# Patient Record
Sex: MALE | Race: WHITE | HISPANIC OR LATINO | Employment: FULL TIME | URBAN - METROPOLITAN AREA
[De-identification: names, ages, dates, MRNs, and addresses within clinical notes are randomized per-mention and may not be internally consistent; named-entity substitution may affect disease eponyms.]

---

## 2024-09-12 ENCOUNTER — OFFICE VISIT (OUTPATIENT)
Dept: URGENT CARE | Facility: PHYSICIAN GROUP | Age: 29
End: 2024-09-12
Payer: COMMERCIAL

## 2024-09-12 VITALS
HEART RATE: 84 BPM | WEIGHT: 130 LBS | OXYGEN SATURATION: 96 % | HEIGHT: 63 IN | RESPIRATION RATE: 16 BRPM | SYSTOLIC BLOOD PRESSURE: 112 MMHG | DIASTOLIC BLOOD PRESSURE: 60 MMHG | BODY MASS INDEX: 23.04 KG/M2 | TEMPERATURE: 97.7 F

## 2024-09-12 DIAGNOSIS — R31.9 HEMATURIA, UNSPECIFIED TYPE: ICD-10-CM

## 2024-09-12 LAB
APPEARANCE UR: CLEAR
BILIRUB UR STRIP-MCNC: NEGATIVE MG/DL
COLOR UR AUTO: YELLOW
GLUCOSE UR STRIP.AUTO-MCNC: NEGATIVE MG/DL
KETONES UR STRIP.AUTO-MCNC: NEGATIVE MG/DL
LEUKOCYTE ESTERASE UR QL STRIP.AUTO: NEGATIVE
NITRITE UR QL STRIP.AUTO: NEGATIVE
PH UR STRIP.AUTO: 7.5 [PH] (ref 5–8)
PROT UR QL STRIP: NEGATIVE MG/DL
RBC UR QL AUTO: NEGATIVE
SP GR UR STRIP.AUTO: 1.01
UROBILINOGEN UR STRIP-MCNC: 0.2 MG/DL

## 2024-09-12 PROCEDURE — 99203 OFFICE O/P NEW LOW 30 MIN: CPT | Performed by: NURSE PRACTITIONER

## 2024-09-12 PROCEDURE — 81002 URINALYSIS NONAUTO W/O SCOPE: CPT | Performed by: NURSE PRACTITIONER

## 2024-09-12 PROCEDURE — 3078F DIAST BP <80 MM HG: CPT | Performed by: NURSE PRACTITIONER

## 2024-09-12 PROCEDURE — 3074F SYST BP LT 130 MM HG: CPT | Performed by: NURSE PRACTITIONER

## 2024-09-12 ASSESSMENT — ENCOUNTER SYMPTOMS
CHILLS: 0
BACK PAIN: 0
FEVER: 0
VOMITING: 0
ABDOMINAL PAIN: 0
NAUSEA: 0
FLANK PAIN: 0
DIARRHEA: 0

## 2024-09-12 NOTE — PROGRESS NOTES
Subjective     Sam Bowman is a 28 y.o. male who presents with Blood in Urine (Started last night)            HPI  New problem.  Patient is a 28-year-old male who presents with blood in his urine that he noticed last night.  He reports that today he has not noticed any more of this.  He denies burning with urination, frequency, urgency, abdominal pain, or back pain.  He denies any penile discharge.  He reports that he has never noticed blood in his urine prior to last night.    Patient has no known allergies.  No current outpatient medications on file prior to visit.     No current facility-administered medications on file prior to visit.     Social History     Socioeconomic History    Marital status: Single     Spouse name: Not on file    Number of children: Not on file    Years of education: Not on file    Highest education level: Not on file   Occupational History    Not on file   Tobacco Use    Smoking status: Not on file    Smokeless tobacco: Not on file   Substance and Sexual Activity    Alcohol use: Not on file    Drug use: Not on file    Sexual activity: Not on file   Other Topics Concern    Not on file   Social History Narrative    Not on file     Social Determinants of Health     Financial Resource Strain: Not on file   Food Insecurity: Not on file   Transportation Needs: Not on file   Physical Activity: Not on file   Stress: Not on file   Social Connections: Not on file   Intimate Partner Violence: Not on file   Housing Stability: Not on file     Breast Cancer-related family history is not on file.      Review of Systems   Constitutional:  Negative for chills and fever.   Gastrointestinal:  Negative for abdominal pain, diarrhea, nausea and vomiting.   Genitourinary:  Positive for hematuria. Negative for dysuria, flank pain, frequency and urgency.   Musculoskeletal:  Negative for back pain.              Objective     /60 (BP Location: Left arm, Patient Position: Sitting, BP Cuff Size: Adult)    "Pulse 84   Temp 36.5 °C (97.7 °F) (Temporal)   Resp 16   Ht 1.6 m (5' 3\")   Wt 59 kg (130 lb)   SpO2 96%   BMI 23.03 kg/m²      Physical Exam  Vitals reviewed.   Constitutional:       General: He is not in acute distress.     Appearance: He is well-developed.   Cardiovascular:      Rate and Rhythm: Normal rate and regular rhythm.      Heart sounds: Normal heart sounds. No murmur heard.  Pulmonary:      Effort: Pulmonary effort is normal. No respiratory distress.      Breath sounds: Normal breath sounds.   Abdominal:      General: Bowel sounds are normal.      Palpations: Abdomen is soft.      Tenderness: There is no abdominal tenderness. There is no right CVA tenderness or left CVA tenderness.   Musculoskeletal:         General: Normal range of motion.      Comments: Moves all 4 extremities normally   Skin:     General: Skin is warm and dry.   Neurological:      Mental Status: He is alert and oriented to person, place, and time.   Psychiatric:         Behavior: Behavior normal.         Thought Content: Thought content normal.                             Assessment & Plan   1. Hematuria, unspecified type  POCT Urinalysis    Referral to Urology        Urinalysis negative for blood.  Referral to urology per patient request.  Differential diagnosis, natural history, supportive care, and indications for immediate follow-up were discussed.        Assessment & Plan  Gross hematuria                       "

## 2024-09-20 NOTE — PROGRESS NOTES
Subjective  Sam Bowman is a 28 y.o. male who presents today for hematuria.He had gross hematuria but at urgent care his UA was negative.  First episode 2 weeks ago. Has recurred twice since then. No CVAT or dysuria. Stream ok. Never smoker, occasional marijuana use.       No family history on file.    Social History     Socioeconomic History    Marital status: Single       No past surgical history on file.    No past medical history on file.    No current outpatient medications on file.       No Known Allergies    Objective  There were no vitals taken for this visit.  Physical Exam  Constitutional:       Appearance: Normal appearance. He is normal weight.   Genitourinary:     Penis: Normal.    Neurological:      Mental Status: He is alert.           Labs:   POCT UA   Lab Results   Component Value Date/Time    POCCOLOR Yellow 09/12/2024 10:08 AM    POCAPPEAR Clear 09/12/2024 10:08 AM    POCLEUKEST Negative 09/12/2024 10:08 AM    POCNITRITE Negative 09/12/2024 10:08 AM    POCUROBILIGE 0.2 09/12/2024 10:08 AM    POCPROTEIN Negative 09/12/2024 10:08 AM    POCURPH 7.5 09/12/2024 10:08 AM    POCBLOOD Negative 09/12/2024 10:08 AM    POCSPGRV 1.015 09/12/2024 10:08 AM    POCKETONES Negative 09/12/2024 10:08 AM    POCBILIRUBIN Negative 09/12/2024 10:08 AM    POCGLUCUA Negative 09/12/2024 10:08 AM        Imaging:   None        Assessment & Plan  Gross hematuria. Normal cysto. Will get BMP and CT urogram.  I will call him with the results of the CT scan.    Jeancarlos Schmidt M.D., F.A.C.S.  Urologic Oncology  Vice-Chair, Department of Surgery  [facility]

## 2024-09-24 ENCOUNTER — OFFICE VISIT (OUTPATIENT)
Dept: UROLOGY | Facility: MEDICAL CENTER | Age: 29
End: 2024-09-24
Payer: COMMERCIAL

## 2024-09-24 ENCOUNTER — HOSPITAL ENCOUNTER (OUTPATIENT)
Dept: LAB | Facility: MEDICAL CENTER | Age: 29
End: 2024-09-24
Attending: UROLOGY
Payer: COMMERCIAL

## 2024-09-24 VITALS
HEART RATE: 73 BPM | BODY MASS INDEX: 23.04 KG/M2 | SYSTOLIC BLOOD PRESSURE: 140 MMHG | WEIGHT: 130 LBS | TEMPERATURE: 97.8 F | OXYGEN SATURATION: 98 % | HEIGHT: 63 IN | DIASTOLIC BLOOD PRESSURE: 72 MMHG

## 2024-09-24 DIAGNOSIS — R31.0 HEMATURIA, GROSS: ICD-10-CM

## 2024-09-24 LAB
APPEARANCE UR: CLEAR
BILIRUB UR STRIP-MCNC: NORMAL MG/DL
COLOR UR AUTO: YELLOW
GLUCOSE UR STRIP.AUTO-MCNC: NORMAL MG/DL
KETONES UR STRIP.AUTO-MCNC: NORMAL MG/DL
LEUKOCYTE ESTERASE UR QL STRIP.AUTO: NORMAL
NITRITE UR QL STRIP.AUTO: NORMAL
PH UR STRIP.AUTO: 7 [PH] (ref 5–8)
PROT UR QL STRIP: NORMAL MG/DL
RBC UR QL AUTO: NORMAL
SP GR UR STRIP.AUTO: 1.02
UROBILINOGEN UR STRIP-MCNC: NORMAL MG/DL

## 2024-09-24 PROCEDURE — 52000 CYSTOURETHROSCOPY: CPT | Performed by: UROLOGY

## 2024-09-24 PROCEDURE — 81002 URINALYSIS NONAUTO W/O SCOPE: CPT | Performed by: UROLOGY

## 2024-09-24 PROCEDURE — 80048 BASIC METABOLIC PNL TOTAL CA: CPT

## 2024-09-24 PROCEDURE — 3078F DIAST BP <80 MM HG: CPT | Performed by: UROLOGY

## 2024-09-24 PROCEDURE — 36415 COLL VENOUS BLD VENIPUNCTURE: CPT

## 2024-09-24 PROCEDURE — 3077F SYST BP >= 140 MM HG: CPT | Performed by: UROLOGY

## 2024-09-24 PROCEDURE — 99203 OFFICE O/P NEW LOW 30 MIN: CPT | Mod: 25 | Performed by: UROLOGY

## 2024-09-24 NOTE — PROGRESS NOTES
"Subjective  Sam Bowman is a 28 y.o. male who presents today for cystoscopy to evaluate Gross Hematuria.     No family history on file.    Social History     Socioeconomic History    Marital status: Single       No past surgical history on file.    No past medical history on file.    No current outpatient medications on file.       No Known Allergies    Objective  BP (!) 140/72 (BP Location: Left arm, Patient Position: Sitting, BP Cuff Size: Adult)   Pulse 73   Temp 36.6 °C (97.8 °F) (Temporal)   Ht 1.6 m (5' 3\")   Wt 59 kg (130 lb)   SpO2 98%   BMI 23.03 kg/m²   Physical Exam      Labs:     POC UA  Lab Results   Component Value Date/Time    POCCOLOR Yellow 09/12/2024 10:08 AM    POCAPPEAR Clear 09/12/2024 10:08 AM    POCLEUKEST Negative 09/12/2024 10:08 AM    POCNITRITE Negative 09/12/2024 10:08 AM    POCUROBILIGE 0.2 09/12/2024 10:08 AM    POCPROTEIN Negative 09/12/2024 10:08 AM    POCURPH 7.5 09/12/2024 10:08 AM    POCBLOOD Negative 09/12/2024 10:08 AM    POCSPGRV 1.015 09/12/2024 10:08 AM    POCKETONES Negative 09/12/2024 10:08 AM    POCBILIRUBIN Negative 09/12/2024 10:08 AM    POCGLUCUA Negative 09/12/2024 10:08 AM        CMP No results found for: \"SODIUM\", \"POTASSIUM\", \"CHLORIDE\", \"CO2\", \"ANION\", \"GLUCOSE\", \"BUN\", \"CREATININE\", \"GFRCKD\", \"CALCIUM\", \"CORRCALC\", \"ASTSGOT\", \"ALTSGPT\", \"ALKPHOSPHAT\", \"TBILIRUBIN\", \"ALBUMIN\", \"TOTPROTEIN\", \"GLOBULIN\", \"AGRATIO\", \"GLYCOHEM\"    BMP  No results found for: \"SODIUM\", \"POTASSIUM\", \"CHLORIDE\", \"CO2\", \"GLUCOSE\", \"BUN\", \"CREATININE\", \"CALCIUM\"    CBC  No results found for: \"WBC\", \"RBC\", \"HEMOGLOBIN\", \"HEMATOCRIT\", \"MCV\", \"MCH\", \"MCHC\", \"RDW\", \"MPV\", \"LYMPHOCYTES\", \"LYMPHS\", \"MONOCYTES\", \"MONOS\", \"EOSINOPHILS\", \"EOS\", \"BASOPHILS\", \"BASO\", \"NRBC\"    A1C  Lab Results   Component Value Date/Time    HBA1C 5.3 10/20/2021 1049    AVGLUC 105 10/20/2021 1049    AVGLUC 5.8 10/20/2021 1049         Procedure    Procedure performed: Cystoscopy       Surgeon: Dr. Elizondo" Brayan    Indications For Procedure: Gross Hematuria    Blood Loss: 0 cc     Anesthesia: Lidocaine jelly     Specimen: None     Findings:     1. Urethra: no lesions or masses    2. Bladder: no lesions or masses    3. Bilateral ureteral jets observed with clear efflux      4. Prostate: minimal lateral lobe hypertrophy, small median lobe, 3 cm prostate length     Description of Procedure: The patient was prepped and draped in the usual sterile fashion.  A 17Fr flexible cystoscope was advanced along the urethra into the bladder under direct vision.  We performed a thorough cystoscopic evaluation patient's bladder including retroflexion, findings noted above.  We removed the cystoscope under direct vision to evaluate the urethra and prostate, findings noted above.  This concluded the procedure, the patient tolerated it well.     Assessment  Patient was instructed to call our office if he develops any signs of infection including increased frequency, urgency, dysuria, fever, or chills.  We discussed the results of the cystoscopy with the patient, all questions and concerns addressed.     Plan    CT urogram.  I will call with the results.     Jeancarlos Schmidt M.D., F.A.C.S.  Urologic Oncology  Vice-Chair, Department of Surgery  [facility]

## 2024-09-25 LAB
ANION GAP SERPL CALC-SCNC: 11 MMOL/L (ref 7–16)
BUN SERPL-MCNC: 10 MG/DL (ref 8–22)
CALCIUM SERPL-MCNC: 9.7 MG/DL (ref 8.5–10.5)
CHLORIDE SERPL-SCNC: 103 MMOL/L (ref 96–112)
CO2 SERPL-SCNC: 25 MMOL/L (ref 20–33)
CREAT SERPL-MCNC: 0.78 MG/DL (ref 0.5–1.4)
GFR SERPLBLD CREATININE-BSD FMLA CKD-EPI: 125 ML/MIN/1.73 M 2
GLUCOSE SERPL-MCNC: 91 MG/DL (ref 65–99)
POTASSIUM SERPL-SCNC: 3.7 MMOL/L (ref 3.6–5.5)
SODIUM SERPL-SCNC: 139 MMOL/L (ref 135–145)